# Patient Record
Sex: FEMALE | Race: AMERICAN INDIAN OR ALASKA NATIVE
[De-identification: names, ages, dates, MRNs, and addresses within clinical notes are randomized per-mention and may not be internally consistent; named-entity substitution may affect disease eponyms.]

---

## 2020-05-31 ENCOUNTER — HOSPITAL ENCOUNTER (EMERGENCY)
Dept: HOSPITAL 5 - ED | Age: 25
Discharge: HOME | End: 2020-05-31
Payer: MEDICAID

## 2020-05-31 VITALS — DIASTOLIC BLOOD PRESSURE: 86 MMHG | SYSTOLIC BLOOD PRESSURE: 148 MMHG

## 2020-05-31 DIAGNOSIS — N39.0: Primary | ICD-10-CM

## 2020-05-31 LAB
BILIRUB UR QL STRIP: (no result)
BLOOD UR QL VISUAL: (no result)
MUCOUS THREADS #/AREA URNS HPF: (no result) /HPF
PH UR STRIP: 6 [PH] (ref 5–7)
RBC #/AREA URNS HPF: 9 /HPF (ref 0–6)
UROBILINOGEN UR-MCNC: < 2 MG/DL (ref ?–2)
WBC #/AREA URNS HPF: > 182 /HPF (ref 0–6)

## 2020-05-31 PROCEDURE — 87086 URINE CULTURE/COLONY COUNT: CPT

## 2020-05-31 PROCEDURE — 81001 URINALYSIS AUTO W/SCOPE: CPT

## 2020-05-31 PROCEDURE — 81025 URINE PREGNANCY TEST: CPT

## 2020-05-31 PROCEDURE — 99283 EMERGENCY DEPT VISIT LOW MDM: CPT

## 2020-05-31 NOTE — EMERGENCY DEPARTMENT REPORT
ED Female  HPI





- General


Chief complaint: Vaginal Bleeding


Stated complaint: VAG BLEEDING


Time Seen by Provider: 20 17:00


Source: patient


Mode of arrival: Ambulatory


Limitations: No Limitations





- History of Present Illness


Initial comments: 





25-year-old  female presents to the emergency room for lower 

abdominal pain x1.5 weeks.  Patient reports her last menstrual period was 

2020.  She is  2 para 1.  Patient states that she is followed blood 

in her urine with clots.  Patient reports mild discharge with no odor.  She 

denies any fever chills no nausea no vomiting.  She denies any dysuria or 

vaginal pain.  She is allergic to ibuprofen and tramadol and naproxen.  She 

currently on Depakote.  She has no other complaints.


Onset/Timin


-: week(s)


Location: suprapubic


Radiation: non-radiating


Severity: moderate


Severity scale (0 -10): 5


Consistency: intermittent


Improves with: none


Worsens with: none


Associated Symptoms: vaginal discharge





- Related Data


Sexually active: Yes


: 2


Para: 1


                                  Previous Rx's











 Medication  Instructions  Recorded  Last Taken  Type


 


Nitrofurantoin Mono/M-Cryst 100 mg PO Q12HR 10 Days #20 capsule 20 Unknown

Rx





[Macrobid CAP]    











                                    Allergies











Allergy/AdvReac Type Severity Reaction Status Date / Time


 


No Known Allergies Allergy   Unverified 20 15:35














ED Review of Systems


ROS: 


Stated complaint: VAG BLEEDING


Other details as noted in HPI





Comment: All other systems reviewed and negative





ED Past Medical Hx





- Past Medical History


Previous Medical History?: Yes


Hx Asthma: Yes





- Surgical History


Past Surgical History?: No





- Social History


Smoking Status: Never Smoker


Substance Use Type: None





- Medications


Home Medications: 


                                Home Medications











 Medication  Instructions  Recorded  Confirmed  Last Taken  Type


 


Nitrofurantoin Mono/M-Cryst 100 mg PO Q12HR 10 Days #20 capsule 20  

Unknown Rx





[Macrobid CAP]     














ED Physical Exam





- General


Limitations: No Limitations


General appearance: alert, in no apparent distress





- Head


Head exam: Present: atraumatic, normocephalic





- Eye


Eye exam: Present: normal appearance





- ENT


ENT exam: Present: mucous membranes moist





- Neck


Neck exam: Present: normal inspection, full ROM





- GI/Abdominal


GI/Abdominal exam: Present: soft, tenderness (Suprapubic).  Absent: distended





- Rectal


Rectal exam: Present: deferred





- Extremities Exam


Extremities exam: Present: normal inspection





- Back Exam


Back exam: Present: CVA tenderness (R)





- Neurological Exam


Neurological exam: Present: alert, oriented X3, normal gait





- Psychiatric


Psychiatric exam: Present: normal affect, normal mood





- Skin


Skin exam: Present: warm, dry, intact, normal color.  Absent: rash





ED Course





                                   Vital Signs











  20





  15:36


 


Temperature 98.8 F


 


Pulse Rate 80


 


Respiratory 14





Rate 


 


Blood Pressure 148/86


 


O2 Sat by Pulse 98





Oximetry 














ED Medical Decision Making





- Lab Data








                                Laboratory Tests











  20





  Unknown


 


Urine Color  Yellow


 


Urine Turbidity  Cloudy


 


Urine pH  6.0


 


Ur Specific Gravity  1.021


 


Urine Protein  30 mg/dl


 


Urine Glucose (UA)  Neg


 


Urine Ketones  Tr


 


Urine Blood  Neg


 


Urine Nitrite  Neg


 


Urine Bilirubin  Neg


 


Urine Urobilinogen  < 2.0


 


Ur Leukocyte Esterase  Lg


 


Urine WBC (Auto)  > 182.0 H


 


Urine RBC (Auto)  9.0


 


U Epithel Cells (Auto)  20.0 H


 


Urine Mucus  3+


 


Urine Yeast (Budding)  2+


 


Urine HCG, Qual  Negative














- Medical Decision Making





25-year-old  female presents to the emergency room for lower 

abdominal pain x1.5 weeks.  Patient reports her last menstrual period was 

2020.  She is  2 para 1.  Patient states that she is followed blood 

in her urine with clots.  Patient reports mild discharge with no odor.  She 

denies any fever chills no nausea no vomiting.  She denies any dysuria or 

vaginal pain.  She is allergic to ibuprofen and tramadol and naproxen.  She 

currently on Depakote.  She has no other complaints.


Critical care attestation.: 


If time is entered above; I have spent that time in minutes in the direct care 

of this critically ill patient, excluding procedure time.








ED Disposition


Clinical Impression: 


UTI (urinary tract infection)


Qualifiers:


 Hematuria presence: with hematuria 





Disposition: - TO HOME OR SELFCARE


Is pt being admited?: No


Does the pt Need Aspirin: No


Condition: Stable


Instructions:  Urinary Tract Infection in Women (ED)


Additional Instructions: 


Complete antibiotics as prescribed.  Increase your fluids as prescribed.


Prescriptions: 


Nitrofurantoin Mono/M-Cryst [Macrobid CAP] 100 mg PO Q12HR 10 Days #20 capsule


Referrals: 


ANSELMO EDUARDO MD [Staff Physician] - 3-5 Days